# Patient Record
Sex: MALE | Race: BLACK OR AFRICAN AMERICAN | NOT HISPANIC OR LATINO | Employment: FULL TIME | URBAN - METROPOLITAN AREA
[De-identification: names, ages, dates, MRNs, and addresses within clinical notes are randomized per-mention and may not be internally consistent; named-entity substitution may affect disease eponyms.]

---

## 2020-10-04 ENCOUNTER — HOSPITAL ENCOUNTER (EMERGENCY)
Facility: HOSPITAL | Age: 35
Discharge: HOME | End: 2020-10-04
Attending: EMERGENCY MEDICINE

## 2020-10-04 VITALS
SYSTOLIC BLOOD PRESSURE: 143 MMHG | HEART RATE: 103 BPM | OXYGEN SATURATION: 96 % | TEMPERATURE: 98 F | DIASTOLIC BLOOD PRESSURE: 85 MMHG | RESPIRATION RATE: 20 BRPM | WEIGHT: 181.31 LBS

## 2020-10-04 DIAGNOSIS — S01.01XA SCALP LACERATION, INITIAL ENCOUNTER: Primary | ICD-10-CM

## 2020-10-04 PROCEDURE — 90471 IMMUNIZATION ADMIN: CPT | Mod: VFC | Performed by: EMERGENCY MEDICINE

## 2020-10-04 PROCEDURE — 12001 RPR S/N/AX/GEN/TRNK 2.5CM/<: CPT

## 2020-10-04 PROCEDURE — 25000003 PHARM REV CODE 250: Performed by: EMERGENCY MEDICINE

## 2020-10-04 PROCEDURE — 90715 TDAP VACCINE 7 YRS/> IM: CPT | Mod: SL | Performed by: EMERGENCY MEDICINE

## 2020-10-04 PROCEDURE — 63600175 PHARM REV CODE 636 W HCPCS: Mod: SL | Performed by: EMERGENCY MEDICINE

## 2020-10-04 PROCEDURE — 99283 EMERGENCY DEPT VISIT LOW MDM: CPT | Mod: 25

## 2020-10-04 RX ORDER — LIDOCAINE HYDROCHLORIDE AND EPINEPHRINE 10; 10 MG/ML; UG/ML
1 INJECTION, SOLUTION INFILTRATION; PERINEURAL ONCE
Status: COMPLETED | OUTPATIENT
Start: 2020-10-04 | End: 2020-10-04

## 2020-10-04 RX ADMIN — CLOSTRIDIUM TETANI TOXOID ANTIGEN (FORMALDEHYDE INACTIVATED), CORYNEBACTERIUM DIPHTHERIAE TOXOID ANTIGEN (FORMALDEHYDE INACTIVATED), BORDETELLA PERTUSSIS TOXOID ANTIGEN (GLUTARALDEHYDE INACTIVATED), BORDETELLA PERTUSSIS FILAMENTOUS HEMAGGLUTININ ANTIGEN (FORMALDEHYDE INACTIVATED), BORDETELLA PERTUSSIS PERTACTIN ANTIGEN, AND BORDETELLA PERTUSSIS FIMBRIAE 2/3 ANTIGEN 0.5 ML: 5; 2; 2.5; 5; 3; 5 INJECTION, SUSPENSION INTRAMUSCULAR at 11:10

## 2020-10-04 RX ADMIN — LIDOCAINE HYDROCHLORIDE,EPINEPHRINE BITARTRATE 1 ML: 10; .01 INJECTION, SOLUTION INFILTRATION; PERINEURAL at 11:10
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
## 2020-10-04 NOTE — DISCHARGE INSTRUCTIONS
Removal in 10 days.  You wound clean and dry warm soapy water.  Use Neosporin as needed.  Have the sutures removed in 10 days.  Return as needed for any worsening symptoms, problems, questions or concerns

## 2024-08-12 ENCOUNTER — HOSPITAL ENCOUNTER (EMERGENCY)
Facility: HOSPITAL | Age: 39
Discharge: HOME | End: 2024-08-12
Attending: STUDENT IN AN ORGANIZED HEALTH CARE EDUCATION/TRAINING PROGRAM

## 2024-08-12 VITALS
RESPIRATION RATE: 17 BRPM | DIASTOLIC BLOOD PRESSURE: 83 MMHG | SYSTOLIC BLOOD PRESSURE: 159 MMHG | HEART RATE: 76 BPM | TEMPERATURE: 98 F | OXYGEN SATURATION: 99 %

## 2024-08-12 DIAGNOSIS — M79.89 LEG SWELLING: Primary | ICD-10-CM

## 2024-08-12 DIAGNOSIS — R07.89 CHEST PRESSURE: ICD-10-CM

## 2024-08-12 LAB
ALBUMIN SERPL BCP-MCNC: 4.4 G/DL (ref 3.5–5.2)
ALP SERPL-CCNC: 63 U/L (ref 55–135)
ALT SERPL W/O P-5'-P-CCNC: 70 U/L (ref 10–44)
ANION GAP SERPL CALC-SCNC: 10 MMOL/L (ref 8–16)
AST SERPL-CCNC: 34 U/L (ref 10–40)
BASOPHILS # BLD AUTO: 0.05 K/UL (ref 0–0.2)
BASOPHILS NFR BLD: 0.8 % (ref 0–1.9)
BILIRUB SERPL-MCNC: 0.5 MG/DL (ref 0.1–1)
BNP SERPL-MCNC: 10 PG/ML (ref 0–99)
BUN SERPL-MCNC: 14 MG/DL (ref 6–20)
CALCIUM SERPL-MCNC: 9.2 MG/DL (ref 8.7–10.5)
CHLORIDE SERPL-SCNC: 107 MMOL/L (ref 95–110)
CO2 SERPL-SCNC: 22 MMOL/L (ref 23–29)
CREAT SERPL-MCNC: 1.1 MG/DL (ref 0.5–1.4)
DIFFERENTIAL METHOD BLD: ABNORMAL
EOSINOPHIL # BLD AUTO: 0.1 K/UL (ref 0–0.5)
EOSINOPHIL NFR BLD: 0.9 % (ref 0–8)
ERYTHROCYTE [DISTWIDTH] IN BLOOD BY AUTOMATED COUNT: 12.7 % (ref 11.5–14.5)
EST. GFR  (NO RACE VARIABLE): >60 ML/MIN/1.73 M^2
GLUCOSE SERPL-MCNC: 100 MG/DL (ref 70–110)
HCT VFR BLD AUTO: 42.2 % (ref 40–54)
HGB BLD-MCNC: 14.3 G/DL (ref 14–18)
IMM GRANULOCYTES # BLD AUTO: 0.04 K/UL (ref 0–0.04)
IMM GRANULOCYTES NFR BLD AUTO: 0.6 % (ref 0–0.5)
LYMPHOCYTES # BLD AUTO: 2.6 K/UL (ref 1–4.8)
LYMPHOCYTES NFR BLD: 39.4 % (ref 18–48)
MCH RBC QN AUTO: 30.2 PG (ref 27–31)
MCHC RBC AUTO-ENTMCNC: 33.9 G/DL (ref 32–36)
MCV RBC AUTO: 89 FL (ref 82–98)
MONOCYTES # BLD AUTO: 0.6 K/UL (ref 0.3–1)
MONOCYTES NFR BLD: 8.6 % (ref 4–15)
NEUTROPHILS # BLD AUTO: 3.2 K/UL (ref 1.8–7.7)
NEUTROPHILS NFR BLD: 49.7 % (ref 38–73)
NRBC BLD-RTO: 0 /100 WBC
OHS QRS DURATION: 96 MS
OHS QTC CALCULATION: 427 MS
PLATELET # BLD AUTO: 294 K/UL (ref 150–450)
PMV BLD AUTO: 9.3 FL (ref 9.2–12.9)
POTASSIUM SERPL-SCNC: 3.5 MMOL/L (ref 3.5–5.1)
PROT SERPL-MCNC: 8 G/DL (ref 6–8.4)
RBC # BLD AUTO: 4.73 M/UL (ref 4.6–6.2)
SODIUM SERPL-SCNC: 139 MMOL/L (ref 136–145)
TROPONIN I SERPL DL<=0.01 NG/ML-MCNC: <0.006 NG/ML (ref 0–0.03)
WBC # BLD AUTO: 6.48 K/UL (ref 3.9–12.7)

## 2024-08-12 PROCEDURE — 99285 EMERGENCY DEPT VISIT HI MDM: CPT | Mod: 25

## 2024-08-12 PROCEDURE — 85025 COMPLETE CBC W/AUTO DIFF WBC: CPT | Performed by: EMERGENCY MEDICINE

## 2024-08-12 PROCEDURE — 80053 COMPREHEN METABOLIC PANEL: CPT | Performed by: EMERGENCY MEDICINE

## 2024-08-12 PROCEDURE — 93010 ELECTROCARDIOGRAM REPORT: CPT | Mod: ,,, | Performed by: INTERNAL MEDICINE

## 2024-08-12 PROCEDURE — 84484 ASSAY OF TROPONIN QUANT: CPT | Performed by: EMERGENCY MEDICINE

## 2024-08-12 PROCEDURE — 83880 ASSAY OF NATRIURETIC PEPTIDE: CPT | Performed by: EMERGENCY MEDICINE

## 2024-08-12 PROCEDURE — 93005 ELECTROCARDIOGRAM TRACING: CPT

## 2024-08-12 NOTE — DISCHARGE INSTRUCTIONS
Please follow-up with your PCP. Please call on the next business day to schedule this appointment.    Use the number for cardiology to follow-up.     Wear compression stockings, especially on days where you will be on your feet for an extended period of time. This will help reduce the swelling.    Alcohol can contribute to your leg swelling as well. Please limit your use and drink responsibly.     Return to the ER with new/worsening symptoms, leg pain, chest pain, shortness of breath, or any other concerns.

## 2024-08-12 NOTE — ED PROVIDER NOTES
"SCRIBE #1 NOTE: I, Valorie Marroquin, am scribing for, and in the presence of, Jalen Muhammad MD. I have scribed the entire note.       History     Chief Complaint   Patient presents with    Leg Swelling     Swelling in karla feet that pt noted approx 45 min PTA, states it "feels like pressure"      Review of patient's allergies indicates:  No Known Allergies      History of Present Illness     HPI    8/12/2024, 5:56 AM  History obtained from the patient      History of Present Illness: Raul Burger is a 39 y.o. male patient with a PMHx of HTN who presents to the Emergency Department for evaluation of BL foot swelling which onset suddenly PTA. Pt states the he drank 4 beers tonight, and noticed the swelling before going to bed. Pt has never experienced this before. 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 Symptoms are constant and moderate in severity. No mitigating or exacerbating factors reported. No associated sxs. Patient denies any leg pain, dysuria, CP, SOB, and all other sxs at this time. No prior Tx reported. No further complaints or concerns at this time.       Arrival mode: Ambulance Service    PCP: Angely, Primary Doctor        Past Medical History:  Past Medical History:   Diagnosis Date    Hypertension        Past Surgical History:  History reviewed. No pertinent surgical history.      Family History:  No family history on file.    Social History:  Social History     Tobacco Use    Smoking status: Never    Smokeless tobacco: Never   Substance and Sexual Activity    Alcohol use: Not on file    Drug use: Never    Sexual activity: Not on file        Review of Systems     Review of Systems   Constitutional:  Negative for fever.   HENT:  Negative for sore throat.    Respiratory:  Negative for shortness of breath.    Cardiovascular:  Negative for chest pain.        (+) BL foot swelling   Gastrointestinal:  Negative for nausea.   Genitourinary:  Negative for dysuria. "   Musculoskeletal:  Negative for back pain.        (-) Leg pain   Skin:  Negative for rash.   Neurological:  Negative for weakness.   Hematological:  Does not bruise/bleed easily.        Physical Exam     Initial Vitals [08/12/24 0409]   BP Pulse Resp Temp SpO2   (!) 161/99 89 18 98.4 °F (36.9 °C) 99 %      MAP       --          Physical Exam  Nursing Notes and Vital Signs Reviewed.  Constitutional: Patient is in no acute distress. Well-developed and well-nourished.  Head: Atraumatic. Normocephalic.  Eyes: PERRL. EOM intact. Conjunctivae are not pale.   ENT: Mucous membranes are moist. Oropharynx is clear and symmetric.    Neck: Supple. Full ROM.   Cardiovascular: Regular rate. Regular rhythm. No murmurs, rubs, or gallops. Distal pulses are 2+ and symmetric.  Pulmonary/Chest: No respiratory distress. Clear to auscultation bilaterally. No wheezing or rales.  Abdominal: Soft and non-distended.  There is no tenderness.  No rebound, guarding, or rigidity.   Musculoskeletal: Moves all extremities. No obvious deformities. Mild non-pitting swelling to BL ankles, no extension into the calves. Negative Angie's sign. No erythema, tenderness, or swelling to calves.  Skin: Warm and dry.  Neurological:  Alert, awake, and appropriate.  Normal speech.  No acute focal neurological deficits are appreciated.  Psychiatric: Normal affect. Good eye contact. Appropriate in content.     ED Course   Procedures  ED Vital Signs:  Vitals:    08/12/24 0409 08/12/24 0432 08/12/24 0532 08/12/24 0600   BP: (!) 161/99 (!) 145/88 (!) 149/83 (!) 159/83   Pulse: 89 75 76 76   Resp: 18 19 (!) 22 17   Temp: 98.4 °F (36.9 °C)      TempSrc: Oral      SpO2: 99% 97% 97% 99%       Abnormal Lab Results:  Labs Reviewed   CBC W/ AUTO DIFFERENTIAL - Abnormal       Result Value    WBC 6.48      RBC 4.73      Hemoglobin 14.3      Hematocrit 42.2      MCV 89      MCH 30.2      MCHC 33.9      RDW 12.7      Platelets 294      MPV 9.3      Immature Granulocytes 0.6  (*)     Gran # (ANC) 3.2      Immature Grans (Abs) 0.04      Lymph # 2.6      Mono # 0.6      Eos # 0.1      Baso # 0.05      nRBC 0      Gran % 49.7      Lymph % 39.4      Mono % 8.6      Eosinophil % 0.9      Basophil % 0.8      Differential Method Automated     COMPREHENSIVE METABOLIC PANEL - Abnormal    Sodium 139      Potassium 3.5      Chloride 107      CO2 22 (*)     Glucose 100      BUN 14      Creatinine 1.1      Calcium 9.2      Total Protein 8.0      Albumin 4.4      Total Bilirubin 0.5      Alkaline Phosphatase 63      AST 34      ALT 70 (*)     eGFR >60      Anion Gap 10     TROPONIN I    Troponin I <0.006     B-TYPE NATRIURETIC PEPTIDE    BNP 10          All Lab Results:  Results for orders placed or performed during the hospital encounter of 08/12/24   CBC auto differential   Result Value Ref Range    WBC 6.48 3.90 - 12.70 K/uL    RBC 4.73 4.60 - 6.20 M/uL    Hemoglobin 14.3 14.0 - 18.0 g/dL    Hematocrit 42.2 40.0 - 54.0 %    MCV 89 82 - 98 fL    MCH 30.2 27.0 - 31.0 pg    MCHC 33.9 32.0 - 36.0 g/dL    RDW 12.7 11.5 - 14.5 %    Platelets 294 150 - 450 K/uL    MPV 9.3 9.2 - 12.9 fL    Immature Granulocytes 0.6 (H) 0.0 - 0.5 %    Gran # (ANC) 3.2 1.8 - 7.7 K/uL    Immature Grans (Abs) 0.04 0.00 - 0.04 K/uL    Lymph # 2.6 1.0 - 4.8 K/uL    Mono # 0.6 0.3 - 1.0 K/uL    Eos # 0.1 0.0 - 0.5 K/uL    Baso # 0.05 0.00 - 0.20 K/uL    nRBC 0 0 /100 WBC    Gran % 49.7 38.0 - 73.0 %    Lymph % 39.4 18.0 - 48.0 %    Mono % 8.6 4.0 - 15.0 %    Eosinophil % 0.9 0.0 - 8.0 %    Basophil % 0.8 0.0 - 1.9 %    Differential Method Automated    Comprehensive metabolic panel   Result Value Ref Range    Sodium 139 136 - 145 mmol/L    Potassium 3.5 3.5 - 5.1 mmol/L    Chloride 107 95 - 110 mmol/L    CO2 22 (L) 23 - 29 mmol/L    Glucose 100 70 - 110 mg/dL    BUN 14 6 - 20 mg/dL    Creatinine 1.1 0.5 - 1.4 mg/dL    Calcium 9.2 8.7 - 10.5 mg/dL    Total Protein 8.0 6.0 - 8.4 g/dL    Albumin 4.4 3.5 - 5.2 g/dL    Total Bilirubin  0.5 0.1 - 1.0 mg/dL    Alkaline Phosphatase 63 55 - 135 U/L    AST 34 10 - 40 U/L    ALT 70 (H) 10 - 44 U/L    eGFR >60 >60 mL/min/1.73 m^2    Anion Gap 10 8 - 16 mmol/L   Troponin I   Result Value Ref Range    Troponin I <0.006 0.000 - 0.026 ng/mL   Brain natriuretic peptide   Result Value Ref Range    BNP 10 0 - 99 pg/mL   EKG 12-lead   Result Value Ref Range    QRS Duration 96 ms    [facility] QTC Calculation 427 ms        Imaging Results:  Imaging Results              X-Ray Chest 1 View (Final result)  Result time 08/12/24 06:39:16      Final result by Robert Gupta MD (08/12/24 06:39:16)                   Impression:      No acute process seen.      Electronically signed by: Robert Gupta MD  Date:    08/12/2024  Time:    06:39               Narrative:    EXAMINATION:  XR CHEST 1 VIEW    CLINICAL HISTORY:  Other chest pain    FINDINGS:  Single view of the chest.  No comparison    Cardiac silhouette is normal.  The lungs demonstrate no evidence of active disease.  No evidence of pleural effusion or pneumothorax.  Bones appear intact.                                       The EKG was ordered, reviewed, and independently interpreted by the ED provider.  Interpretation time: 4:59  Rate: 84 BPM  Rhythm: normal sinus rhythm  Interpretation: No acute ST changes. No STEMI.           The Emergency Provider reviewed the vital signs and test results, which are outlined above.     ED Discussion       6:03 AM: Reassessed pt at this time. Discussed with pt all pertinent ED information and results. Discussed pt dx and plan of tx. Gave pt all f/u and return to the ED instructions. All questions and concerns were addressed at this time. Pt expresses understanding of information and instructions, and is comfortable with plan to discharge. Pt is stable for discharge.    I discussed with patient and/or family/caretaker that evaluation in the ED does not suggest any emergent or life threatening medical conditions requiring immediate  intervention beyond what was provided in the ED, and I believe patient is safe for discharge.  Regardless, an unremarkable evaluation in the ED does not preclude the development or presence of a serious of life threatening condition. As such, patient was instructed to return immediately for any worsening or change in current symptoms.        Medical Decision Making  Differential diagnosis includes stasis edema, congestive heart failure, hypoalbuminemia, electrolyte abnormality, among others.    The patient presents to the emergency department today due to bilateral leg swelling which started at the end of the day today.  The patient pain scars for living, is on his feet all day.  He has a negative Homans sign bilaterally, no erythema, warmth, tenderness.  There was equal swelling in the bilateral lower extremities.  There was no extension into the calves, it stays at the ankles.  There are no untoward findings in the lower extremities that would concern me for DVT or other nefarious pathology.  He has 2+ DP pulses bilaterally.  BNP is 10.  Remainder of workup is unremarkable.  He is young, otherwise healthy, low suspicion for any cardiac ischemia at this time.  Patient with no chest pain or shortness of breath throughout the entire event.    Amount and/or Complexity of Data Reviewed  Labs: ordered. Decision-making details documented in ED Course.  Radiology: ordered and independent interpretation performed. Decision-making details documented in ED Course.     Details: My independent interpretation of patient's CXR reveals no acute focal consolidation, effusion, or pneumothorax.    ECG/medicine tests: ordered and independent interpretation performed. Decision-making details documented in ED Course.    Risk  OTC drugs.                ED Medication(s):  Medications - No data to display    There are no discharge medications for this patient.              Scribe Attestation:   Scribe #1: I performed the above scribed  service and the documentation accurately describes the services I performed. I attest to the accuracy of the note.     Attending:   Physician Attestation Statement for Scribe #1: I, Jalen Muhammad MD, personally performed the services described in this documentation, as scribed by Valorie Marroquin, in my presence, and it is both accurate and complete.       Scribe Attestation:   Scribe #1: I performed the above scribed service and the documentation accurately describes the services I performed. I attest to the accuracy of the note.         Clinical Impression       ICD-10-CM ICD-9-CM   1. Leg swelling  M79.89 729.81   2. Chest pressure  R07.89 786.59       Disposition:   Disposition: Discharged  Condition: Stable        Jalen Muhammad MD  08/26/24 [id number]